# Patient Record
Sex: FEMALE | Race: BLACK OR AFRICAN AMERICAN | NOT HISPANIC OR LATINO | URBAN - METROPOLITAN AREA
[De-identification: names, ages, dates, MRNs, and addresses within clinical notes are randomized per-mention and may not be internally consistent; named-entity substitution may affect disease eponyms.]

---

## 2023-08-18 LAB
ALBUMIN SERPL-MCNC: 3.6 G/DL (ref 3.6–5.1)
ALBUMIN/GLOB SERPL: 0.9 {RATIO} (ref 1–2.4)
ALP SERPL-CCNC: 64 UNITS/L (ref 45–117)
ALT SERPL-CCNC: 14 UNITS/L
ANION GAP SERPL CALC-SCNC: 9 MMOL/L (ref 7–19)
APPEARANCE UR: CLEAR
AST SERPL-CCNC: 12 UNITS/L
BACTERIA #/AREA URNS HPF: ABNORMAL /HPF
BASOPHILS # BLD: 0 K/MCL (ref 0–0.3)
BASOPHILS NFR BLD: 0 %
BILIRUB SERPL-MCNC: 0.5 MG/DL (ref 0.2–1)
BILIRUB UR QL STRIP: NEGATIVE
BUN SERPL-MCNC: 13 MG/DL (ref 6–20)
BUN/CREAT SERPL: 17 (ref 7–25)
CALCIUM SERPL-MCNC: 8.7 MG/DL (ref 8.4–10.2)
CHLORIDE SERPL-SCNC: 112 MMOL/L (ref 97–110)
CO2 SERPL-SCNC: 22 MMOL/L (ref 21–32)
COLOR UR: ABNORMAL
CREAT SERPL-MCNC: 0.76 MG/DL (ref 0.51–0.95)
DEPRECATED RDW RBC: 46.1 FL (ref 39–50)
EGFRCR SERPLBLD CKD-EPI 2021: >90 ML/MIN/{1.73_M2}
EOSINOPHIL # BLD: 0.1 K/MCL (ref 0–0.5)
EOSINOPHIL NFR BLD: 1 %
ERYTHROCYTE [DISTWIDTH] IN BLOOD: 15.1 % (ref 11–15)
FASTING DURATION TIME PATIENT: ABNORMAL H
GLOBULIN SER-MCNC: 4 G/DL (ref 2–4)
GLUCOSE SERPL-MCNC: 98 MG/DL (ref 70–99)
GLUCOSE UR STRIP-MCNC: NEGATIVE MG/DL
HCT VFR BLD CALC: 38.5 % (ref 36–46.5)
HGB BLD-MCNC: 12.1 G/DL (ref 12–15.5)
HGB UR QL STRIP: NEGATIVE
HYALINE CASTS #/AREA URNS LPF: ABNORMAL /LPF
IMM GRANULOCYTES # BLD AUTO: 0 K/MCL (ref 0–0.2)
IMM GRANULOCYTES # BLD: 0 %
KETONES UR STRIP-MCNC: NEGATIVE MG/DL
LEUKOCYTE ESTERASE UR QL STRIP: ABNORMAL
LYMPHOCYTES # BLD: 2.4 K/MCL (ref 1–4.8)
LYMPHOCYTES NFR BLD: 35 %
MCH RBC QN AUTO: 26.1 PG (ref 26–34)
MCHC RBC AUTO-ENTMCNC: 31.4 G/DL (ref 32–36.5)
MCV RBC AUTO: 83.2 FL (ref 78–100)
MONOCYTES # BLD: 0.7 K/MCL (ref 0.3–0.9)
MONOCYTES NFR BLD: 10 %
MUCOUS THREADS URNS QL MICRO: PRESENT
NEUTROPHILS # BLD: 3.7 K/MCL (ref 1.8–7.7)
NEUTROPHILS NFR BLD: 54 %
NITRITE UR QL STRIP: NEGATIVE
NRBC BLD MANUAL-RTO: 0 /100 WBC
PH UR STRIP: 6.5 [PH] (ref 5–7)
PLATELET # BLD AUTO: 249 K/MCL (ref 140–450)
POTASSIUM SERPL-SCNC: 4.1 MMOL/L (ref 3.4–5.1)
PROT SERPL-MCNC: 7.6 G/DL (ref 6.4–8.2)
PROT UR STRIP-MCNC: ABNORMAL MG/DL
RBC # BLD: 4.63 MIL/MCL (ref 4–5.2)
RBC #/AREA URNS HPF: ABNORMAL /HPF
SODIUM SERPL-SCNC: 139 MMOL/L (ref 135–145)
SP GR UR STRIP: 1.03 (ref 1–1.03)
SQUAMOUS #/AREA URNS HPF: ABNORMAL /HPF
T VAGINALIS URNS QL MICRO: PRESENT
UROBILINOGEN UR STRIP-MCNC: 0.2 MG/DL
WBC # BLD: 6.9 K/MCL (ref 4.2–11)
WBC #/AREA URNS HPF: ABNORMAL /HPF

## 2023-08-18 PROCEDURE — 85025 COMPLETE CBC W/AUTO DIFF WBC: CPT | Performed by: EMERGENCY MEDICINE

## 2023-08-18 PROCEDURE — 80053 COMPREHEN METABOLIC PANEL: CPT | Performed by: EMERGENCY MEDICINE

## 2023-08-18 PROCEDURE — 87086 URINE CULTURE/COLONY COUNT: CPT | Performed by: EMERGENCY MEDICINE

## 2023-08-18 PROCEDURE — 81001 URINALYSIS AUTO W/SCOPE: CPT | Performed by: EMERGENCY MEDICINE

## 2023-08-18 PROCEDURE — 36415 COLL VENOUS BLD VENIPUNCTURE: CPT

## 2023-08-18 ASSESSMENT — PAIN SCALES - GENERAL: PAINLEVEL_OUTOF10: 10

## 2023-08-19 ENCOUNTER — HOSPITAL ENCOUNTER (EMERGENCY)
Age: 28
Discharge: HOME | End: 2023-08-19
Attending: EMERGENCY MEDICINE

## 2023-08-19 ENCOUNTER — APPOINTMENT (OUTPATIENT)
Dept: ULTRASOUND IMAGING | Age: 28
End: 2023-08-19
Attending: EMERGENCY MEDICINE

## 2023-08-19 VITALS
DIASTOLIC BLOOD PRESSURE: 86 MMHG | RESPIRATION RATE: 18 BRPM | HEART RATE: 74 BPM | BODY MASS INDEX: 26.52 KG/M2 | WEIGHT: 165 LBS | TEMPERATURE: 98.2 F | HEIGHT: 66 IN | OXYGEN SATURATION: 98 % | SYSTOLIC BLOOD PRESSURE: 140 MMHG

## 2023-08-19 DIAGNOSIS — N73.0 PID (ACUTE PELVIC INFLAMMATORY DISEASE): ICD-10-CM

## 2023-08-19 DIAGNOSIS — A59.9 TRICHOMONIASIS: Primary | ICD-10-CM

## 2023-08-19 LAB
CLUE CELLS VAG QL WET PREP: ABNORMAL
HCG UR QL: NEGATIVE
RAINBOW EXTRA TUBES HOLD SPECIMEN: NORMAL
T VAGINALIS VAG QL WET PREP: PRESENT
YEAST VAG QL WET PREP: ABNORMAL

## 2023-08-19 PROCEDURE — 87591 N.GONORRHOEAE DNA AMP PROB: CPT

## 2023-08-19 PROCEDURE — 10002803 HB RX 637

## 2023-08-19 PROCEDURE — 96372 THER/PROPH/DIAG INJ SC/IM: CPT

## 2023-08-19 PROCEDURE — 76830 TRANSVAGINAL US NON-OB: CPT

## 2023-08-19 PROCEDURE — 99284 EMERGENCY DEPT VISIT MOD MDM: CPT | Performed by: EMERGENCY MEDICINE

## 2023-08-19 PROCEDURE — 10002800 HB RX 250 W HCPCS

## 2023-08-19 PROCEDURE — 84703 CHORIONIC GONADOTROPIN ASSAY: CPT

## 2023-08-19 PROCEDURE — 10004651 HB RX, NO CHARGE ITEM: Performed by: EMERGENCY MEDICINE

## 2023-08-19 PROCEDURE — 87210 SMEAR WET MOUNT SALINE/INK: CPT

## 2023-08-19 PROCEDURE — 10002801 HB RX 250 W/O HCPCS

## 2023-08-19 RX ORDER — DOXYCYCLINE HYCLATE 100 MG/1
100 CAPSULE ORAL ONCE
Status: COMPLETED | OUTPATIENT
Start: 2023-08-19 | End: 2023-08-19

## 2023-08-19 RX ORDER — METRONIDAZOLE 500 MG/1
500 TABLET ORAL ONCE
Status: COMPLETED | OUTPATIENT
Start: 2023-08-19 | End: 2023-08-19

## 2023-08-19 RX ORDER — METRONIDAZOLE 500 MG/1
500 TABLET ORAL 2 TIMES DAILY
Qty: 28 TABLET | Refills: 0 | Status: SHIPPED | OUTPATIENT
Start: 2023-08-19 | End: 2023-09-02

## 2023-08-19 RX ORDER — ACETAMINOPHEN 500 MG
1000 TABLET ORAL ONCE
Status: COMPLETED | OUTPATIENT
Start: 2023-08-19 | End: 2023-08-19

## 2023-08-19 RX ORDER — DOXYCYCLINE HYCLATE 100 MG
100 TABLET ORAL 2 TIMES DAILY
Qty: 28 TABLET | Refills: 0 | Status: SHIPPED | OUTPATIENT
Start: 2023-08-19 | End: 2023-09-02

## 2023-08-19 RX ADMIN — ACETAMINOPHEN 1000 MG: 500 TABLET ORAL at 09:25

## 2023-08-19 RX ADMIN — METRONIDAZOLE 500 MG: 500 TABLET, FILM COATED ORAL at 10:33

## 2023-08-19 RX ADMIN — DOXYCYCLINE 100 MG: 100 CAPSULE ORAL at 10:33

## 2023-08-19 RX ADMIN — LIDOCAINE HYDROCHLORIDE 500 MG: 10 INJECTION, SOLUTION EPIDURAL; INFILTRATION; INTRACAUDAL; PERINEURAL at 10:34

## 2023-08-19 ASSESSMENT — PAIN SCALES - GENERAL: PAINLEVEL_OUTOF10: 10

## 2023-08-20 LAB — BACTERIA UR CULT: NORMAL

## 2023-08-23 LAB
C TRACH RRNA CVX QL NAA+PROBE: NEGATIVE
Lab: NORMAL
N GONORRHOEA RRNA CVX QL NAA+PROBE: NEGATIVE

## 2024-02-21 ENCOUNTER — HOSPITAL ENCOUNTER (OUTPATIENT)
Age: 29
Discharge: HOME | End: 2024-02-21
Payer: COMMERCIAL

## 2024-02-21 VITALS
OXYGEN SATURATION: 100 % | RESPIRATION RATE: 18 BRPM | TEMPERATURE: 97 F | HEART RATE: 67 BPM | DIASTOLIC BLOOD PRESSURE: 70 MMHG | SYSTOLIC BLOOD PRESSURE: 122 MMHG

## 2024-02-21 DIAGNOSIS — G43.809 OTHER MIGRAINE WITHOUT STATUS MIGRAINOSUS, NOT INTRACTABLE: Primary | ICD-10-CM

## 2024-02-21 PROCEDURE — 99213 OFFICE O/P EST LOW 20 MIN: CPT | Performed by: NURSE PRACTITIONER

## 2024-02-21 RX ORDER — RIMEGEPANT SULFATE 75 MG/75MG
75 TABLET, ORALLY DISINTEGRATING ORAL AS NEEDED
Qty: 10 TABLET | Refills: 0 | Status: SHIPPED | OUTPATIENT
Start: 2024-02-21

## 2024-02-21 NOTE — DISCHARGE INSTRUCTIONS
Try nurtec for your headache.  Rest in the dark.  Cool compresses to your forehead.  Increase water intake.  Establish care with neurology

## 2024-02-21 NOTE — ED PROVIDER NOTES
Patient Seen in: [facility]      History     Chief Complaint   Patient presents with    Headache     Stated Complaint: Headache    Subjective:   28-year-old female with ocular complex migraines presents from home.  She is accompanied by her significant other.  Patient presents with complaint of migraine for about 1 week.  States her headache comes and goes.  She is unsure what triggers the pain or resolves it.  States she has a frontal headache.  Some light sensitivity and blurry vision.  No fever.  No vomiting.  No recent head injury.  Extensive history of migraines.  States she normally takes Nurtec but ran out.  History of allergies to NSAIDs, Tylenol, Benadryl.  States her symptoms are consistent with previous migraines.  States her neurologist is in [place] and she has not seen them for many years.    The history is provided by the patient and a significant other. No  was used.       Objective:   Past Medical History:   Diagnosis Date    Migraines               History reviewed. No pertinent surgical history.             Social History     Socioeconomic History    Marital status: Single              Review of Systems    Positive for stated complaint: Headache  Other systems are as noted in HPI.  Constitutional and vital signs reviewed.      All other systems reviewed and negative except as noted above.    Physical Exam     ED Triage Vitals [02/21/24 1303]   /70   Pulse 67   Resp 18   Temp 97.2 °F (36.2 °C)   Temp src Temporal   SpO2 100 %   O2 Device None (Room air)       Current:/70   Pulse 67   Temp 97.2 °F (36.2 °C) (Temporal)   Resp 18   SpO2 100%         Physical Exam  Vitals and nursing note reviewed.   Constitutional:       General: She is not in acute distress.     Appearance: Normal appearance. She is not ill-appearing or toxic-appearing.   HENT:      Head: Normocephalic and atraumatic.      Comments: No facial rash or tenderness     Right Ear:  Tympanic membrane, ear canal and external ear normal.      Left Ear: Tympanic membrane, ear canal and external ear normal.      Nose: Nose normal.      Mouth/Throat:      Mouth: Mucous membranes are moist.      Pharynx: Oropharynx is clear.   Eyes:      Extraocular Movements: Extraocular movements intact.      Pupils: Pupils are equal, round, and reactive to light.      Comments: Pupils 2+ bilaterally.  No nystagmus.   Cardiovascular:      Rate and Rhythm: Normal rate and regular rhythm.      Pulses: Normal pulses.   Pulmonary:      Effort: Pulmonary effort is normal. No respiratory distress.      Breath sounds: Normal breath sounds.      Comments: Lungs clear.  No adventitious lung sounds.  No distress.  No hypoxia.  Pulse ox 100% ra. Which is normal    Abdominal:      General: Abdomen is flat.      Palpations: Abdomen is soft.   Musculoskeletal:         General: No signs of injury. Normal range of motion.      Cervical back: Normal range of motion and neck supple.   Skin:     General: Skin is warm and dry.      Capillary Refill: Capillary refill takes less than 2 seconds.   Neurological:      General: No focal deficit present.      Mental Status: She is alert and oriented to person, place, and time.      GCS: GCS eye subscore is 4. GCS verbal subscore is 5. GCS motor subscore is 6.      Cranial Nerves: Cranial nerves 2-12 are intact.      Sensory: Sensation is intact.      Motor: Motor function is intact.      Coordination: Coordination is intact.      Gait: Gait is intact.      Comments: Stroke score 0   Psychiatric:         Mood and Affect: Mood normal.         Behavior: Behavior normal.         Thought Content: Thought content normal.         Judgment: Judgment normal.         ED Course   Labs Reviewed - No data to display    MDM        Medical Decision Making  Typical migraine  Differential diagnosis: Migraine, other headache syndrome  Well-appearing on exam.  No distress.  No acute neurodeficit.  Headache not  sudden onset or thunderclap.  Not worst headache of life.  States symptoms consistent with previous known history of migraines.  No suspicion for subarachnoid hemorrhage.  No indication for emergent imaging.  No rash to indicate shingles  No meningeal signs  Offered COVID testing but patient declined  Discussed pain control here but patient has allergies to NSAIDs, Benadryl, Tylenol.  Patient specifically requesting a refill on her Nurtec which was provided (unable to see previous rx history on chart).  Recommend establishing care with neurology in [place], referral provided  Also requesting work note - provided  Results and plan of care discussed with the patient/family. They are in agreement with discharge. They understand to follow up with their primary doctor or the referral physician for further evaluation, especially if no improvement.  Also discussed the limitations of immediate care, patient is aware that if symptoms are worse they should go to the emergency room. Verbal and written discharge instructions were given.       Problems Addressed:  Other migraine without status migrainosus, not intractable: acute illness or injury    Amount and/or Complexity of Data Reviewed  Independent Historian:      Details: Significant other    Risk  Prescription drug management.        Disposition and Plan     Clinical Impression:  1. Other migraine without status migrainosus, not intractable         Disposition:  Discharge  2/21/2024  1:19 pm    Follow-up:  Noe Dockery DO  [address] [address]  [phone]                Medications Prescribed:  Current Discharge Medication List        START taking these medications    Details   Rimegepant Sulfate (NURTEC) 75 MG Oral Tablet Dispersible Take 75 mg by mouth as needed.  Qty: 10 tablet, Refills: 0

## 2024-04-13 ENCOUNTER — HOSPITAL ENCOUNTER (OUTPATIENT)
Age: 29
Discharge: HOME | End: 2024-04-13
Payer: COMMERCIAL

## 2024-04-13 ENCOUNTER — APPOINTMENT (OUTPATIENT)
Dept: ULTRASOUND IMAGING | Age: 29
End: 2024-04-13
Attending: NURSE PRACTITIONER
Payer: COMMERCIAL

## 2024-04-13 VITALS
DIASTOLIC BLOOD PRESSURE: 75 MMHG | HEART RATE: 68 BPM | RESPIRATION RATE: 20 BRPM | TEMPERATURE: 99 F | OXYGEN SATURATION: 98 % | SYSTOLIC BLOOD PRESSURE: 112 MMHG

## 2024-04-13 DIAGNOSIS — N93.9 ABNORMAL VAGINAL BLEEDING: Primary | ICD-10-CM

## 2024-04-13 LAB
#MXD IC: 0.4 X10ˆ3/UL (ref 0.1–1)
B-HCG UR QL: NEGATIVE
BUN BLD-MCNC: 14 MG/DL (ref 7–18)
CHLORIDE BLD-SCNC: 105 MMOL/L (ref 98–112)
CO2 BLD-SCNC: 28 MMOL/L (ref 21–32)
CREAT BLD-MCNC: 0.8 MG/DL
EGFRCR SERPLBLD CKD-EPI 2021: 102 ML/MIN/1.73M2 (ref 60–?)
GLUCOSE BLD-MCNC: 84 MG/DL (ref 70–99)
HCT VFR BLD AUTO: 41.1 %
HCT VFR BLD CALC: 46 %
HGB BLD-MCNC: 12.8 G/DL
ISTAT IONIZED CALCIUM FOR CHEM 8: 1.21 MMOL/L (ref 1.12–1.32)
LYMPHOCYTES # BLD AUTO: 2 X10ˆ3/UL (ref 1–4)
LYMPHOCYTES NFR BLD AUTO: 51.9 %
MCH RBC QN AUTO: 26.1 PG (ref 26–34)
MCHC RBC AUTO-ENTMCNC: 31.1 G/DL (ref 31–37)
MCV RBC AUTO: 83.7 FL (ref 80–100)
MIXED CELL %: 9.3 %
NEUTROPHILS # BLD AUTO: 1.4 X10ˆ3/UL (ref 1.5–7.7)
NEUTROPHILS NFR BLD AUTO: 38.8 %
PLATELET # BLD AUTO: 225 X10ˆ3/UL (ref 150–450)
POTASSIUM BLD-SCNC: 4 MMOL/L (ref 3.6–5.1)
RBC # BLD AUTO: 4.91 X10ˆ6/UL
SODIUM BLD-SCNC: 141 MMOL/L (ref 136–145)
WBC # BLD AUTO: 3.8 X10ˆ3/UL (ref 4–11)

## 2024-04-13 PROCEDURE — 93975 VASCULAR STUDY: CPT | Performed by: NURSE PRACTITIONER

## 2024-04-13 PROCEDURE — 80047 BASIC METABLC PNL IONIZED CA: CPT | Performed by: NURSE PRACTITIONER

## 2024-04-13 PROCEDURE — 99214 OFFICE O/P EST MOD 30 MIN: CPT | Performed by: NURSE PRACTITIONER

## 2024-04-13 PROCEDURE — 76830 TRANSVAGINAL US NON-OB: CPT | Performed by: NURSE PRACTITIONER

## 2024-04-13 PROCEDURE — 85025 COMPLETE CBC W/AUTO DIFF WBC: CPT | Performed by: NURSE PRACTITIONER

## 2024-04-13 PROCEDURE — 87591 N.GONORRHOEAE DNA AMP PROB: CPT | Performed by: NURSE PRACTITIONER

## 2024-04-13 PROCEDURE — 81025 URINE PREGNANCY TEST: CPT | Performed by: NURSE PRACTITIONER

## 2024-04-13 PROCEDURE — 76856 US EXAM PELVIC COMPLETE: CPT | Performed by: NURSE PRACTITIONER

## 2024-04-13 PROCEDURE — 87491 CHLMYD TRACH DNA AMP PROBE: CPT | Performed by: NURSE PRACTITIONER

## 2024-04-13 NOTE — ED PROVIDER NOTES
Chief Complaint   Patient presents with    Abdominal Pain       HPI:     Bill is a 28 year old female who presents with a chief complaint of right sided pelvic pain and vaginal bleeding with clots, ongoing since Monday. She reports bleeding though a pad an hour.  She reports she is currently trying to become pregnant, LMP March 8, 2024.  No fever, flank pain, dysuria, urinary frequency, urinary urgency.  She reports she has irregular periods.  She has never been on birth control.  She reports she has a history of right ovarian cysts.  No vomiting, diarrhea, or generalized abdominal pain.   Has a history of appendectomy    [facility]: [facility] asessment screens reviewed and agree.   Nurses notes reviewed I agree with documentation.    No family history on file.  Family history reviewed with patient/caregiver and is not pertinent to presenting problem.  Social History     Socioeconomic History    Marital status: Single     Spouse name: Not on file    Number of children: Not on file    Years of education: Not on file    Highest education level: Not on file   Occupational History    Not on file   Tobacco Use    Smoking status: Never    Smokeless tobacco: Never   Vaping Use    Vaping status: Never Used   Substance and Sexual Activity    Alcohol use: Never    Drug use: Never    Sexual activity: Not on file   Other Topics Concern    Not on file   Social History Narrative    Not on file     Social Determinants of Health     Financial Resource Strain: Not on file   Food Insecurity: Not on file   Transportation Needs: Not on file   Physical Activity: Not on file   Stress: Not on file   Social Connections: Not on file   Housing Stability: Not on file       ROS:   Positive for stated complaint: vaginal bleeding, pelvic pain  All other systems reviewed and negative except as noted above.  Constitutional and Vital Signs Reviewed.      Physical Exam:     /75   Pulse 68   Temp 98.5 °F (36.9 °C) (Temporal)   Resp 20   LMP  03/08/2024 (Exact Date)   SpO2 98%   GENERAL: well developed, well nourished, well hydrated, no distress  EYES: sclera non icteric bilateral  HEENT: atraumatic, normocephalic, ears, nose and throat are clear  NECK: supple, no adenopathy  LUNGS: clear to auscultation, no RRW  CARDIO: RRR without murmur  GI: right sided pelvic tenderness noted. No other abdominal tenderness. BS present and active in all quadrants.   : + red blood in vaginal canal. + right adnexal tenderness. NO left adnexal tenderness. NO CMT. Chaperone offered and declined.   EXTREMITIES: no cyanosis or edema. ROJAS without difficulty  SKIN: good skin turgor, no obvious rashes  MDM/Assessment/Plan:   Orders for this encounter:    Orders Placed This Encounter    US PELVIS EV W DOPPLER (CPT=76856/25632/47387)     eval g     Order Specific Question:   What is the Relevant Clinical Indication / Reason for Exam?     Answer:   eval g     Order Specific Question:   Release to patient     Answer:   Immediate    POCT CBC     Order Specific Question:   Release to patient     Answer:   Immediate    POCT Pregnancy, Urine    POCT ISTAT chem8 cartridge    Chlamydia/Gc Amplification     Order Specific Question:   Release to patient     Answer:   Immediate    POCT Pregnancy, Urine    iStat (Chem 8)       Labs performed this visit:  Recent Results (from the past 10 hour(s))   POCT Pregnancy, Urine    Collection Time: 04/13/24 11:45 AM   Result Value Ref Range    POCT Urine Pregnancy Negative Negative   POCT CBC    Collection Time: 04/13/24 12:25 PM   Result Value Ref Range    WBC IC 3.8 (L) 4.0 - 11.0 x10ˆ3/uL    RBC IC 4.91 3.80 - 5.30 X10ˆ6/uL    HGB IC 12.8 12.0 - 16.0 g/dL    HCT IC 41.1 35.0 - 48.0 %    MCV IC 83.7 80.0 - 100.0 fL    MCH IC 26.1 26.0 - 34.0 pg    MCHC IC 31.1 31.0 - 37.0 g/dL    PLT .0 150.0 - 450.0 X10ˆ3/uL    # Neutrophil 1.4 (L) 1.5 - 7.7 X10ˆ3/uL    # Lymphocyte 2.0 1.0 - 4.0 X10ˆ3/uL    # Mixed Cells 0.4 0.1 - 1.0 X10ˆ3/uL     Neutrophil % 38.8 %    Lymphocyte % 51.9 %    Mixed Cell % 9.3 %   POCT ISTAT chem8 cartridge    Collection Time: 04/13/24 12:30 PM   Result Value Ref Range    ISTAT Sodium 141 136 - 145 mmol/L    ISTAT BUN 14 7 - 18 mg/dL    ISTAT Potassium 4.0 3.6 - 5.1 mmol/L    ISTAT Chloride 105 98 - 112 mmol/L    ISTAT Ionized Calcium 1.21 1.12 - 1.32 mmol/L    ISTAT Hematocrit 46 34 - 50 %    ISTAT Glucose 84 70 - 99 mg/dL    ISTAT TCO2 28 21 - 32 mmol/L    ISTAT Creatinine 0.80 0.55 - 1.02 mg/dL    eGFR-Cr 102 >=60 mL/min/1.73m2        MDM:  Medical Decision Making  Differentials include: physiologic bleeding secondary to menstrual cycle versus dysfunctional uterine bleeding versus ovarian torsion versus ovarian abscess versus fibroid versus hemorrhagic cyst versus pregnancy related issue versus other    Urine pregnancy is negative.  Suspect physiologic bleeding secondary to menstrual cycle vs dysfunctional uterine bleeding. CBC shows normal hemoglobin.  No ovarian torsion or ovarian abscess noted on ultrasound.  No fibroid or hemorrhagic cyst noted on ultrasound.  Discussed follow-up with OB/GYN should abnormal bleeding continue.  ER for worsening symptoms.  Patient verbalized understanding and agreeable to plan of care.     Case discussed with Dr. Agatha Goldstein    Amount and/or Complexity of Data Reviewed  Labs: ordered. Decision-making details documented in ED Course.     Details: Urine pregnancy negative  CBC wnl- mildly decreased WBC and abs neut  Chem 8 normal  Radiology: ordered.     Details: Pelvic US-  no ovarian torsion or abscess     Risk  OTC drugs.            Diagnosis:    ICD-10-CM    1. Abnormal vaginal bleeding  N93.9 US PELVIS EV W DOPPLER (CPT=76856/38891/62737)     US PELVIS EV W DOPPLER (CPT=76856/45956/68861)     Chlamydia/Gc Amplification     Chlamydia/Gc Amplification          All results reviewed and discussed with patient.  See AVS for detailed discharge instructions for your condition  today.    Follow Up with:  Florentino Vaz, DO  [address] [facility] [address]  [facility] [id number] [phone]

## 2024-04-13 NOTE — DISCHARGE INSTRUCTIONS
Tylenol 650 mg every 4-6 hours as needed   Please follow up with OB/GYN as discussed  If you develop worsening symptoms, please go to ER  We will contact you with the result of your urine as they become available

## 2024-04-13 NOTE — ED INITIAL ASSESSMENT (HPI)
Pt with pain to \"R ovary\" and vaginal bleeding, including clots, since Monday. Pt described pain as burning, rated 9/10. Pt unsure if pregnant, LMP 03/08/24.

## 2024-04-15 LAB
C TRACH DNA SPEC QL NAA+PROBE: NEGATIVE
N GONORRHOEA DNA SPEC QL NAA+PROBE: NEGATIVE

## 2024-04-19 ENCOUNTER — OFFICE VISIT (OUTPATIENT)
Dept: OBGYN CLINIC | Facility: CLINIC | Age: 29
End: 2024-04-19
Payer: COMMERCIAL

## 2024-04-19 VITALS — WEIGHT: 180.19 LBS | DIASTOLIC BLOOD PRESSURE: 71 MMHG | SYSTOLIC BLOOD PRESSURE: 117 MMHG | HEART RATE: 82 BPM

## 2024-04-19 DIAGNOSIS — N83.209 RUPTURED OVARIAN CYST: Primary | ICD-10-CM

## 2024-04-19 PROCEDURE — 3078F DIAST BP <80 MM HG: CPT | Performed by: OBSTETRICS & GYNECOLOGY

## 2024-04-19 PROCEDURE — 99202 OFFICE O/P NEW SF 15 MIN: CPT | Performed by: OBSTETRICS & GYNECOLOGY

## 2024-04-19 PROCEDURE — 96127 BRIEF EMOTIONAL/BEHAV ASSMT: CPT | Performed by: OBSTETRICS & GYNECOLOGY

## 2024-04-19 PROCEDURE — 3074F SYST BP LT 130 MM HG: CPT | Performed by: OBSTETRICS & GYNECOLOGY

## 2024-04-20 NOTE — PROGRESS NOTES
Bill Dhillon is a 28 year old female  Patient's last menstrual period was 2024 (exact date).   Chief Complaint   Patient presents with    Gyn Problem     NEW PATIENT, F/[facility]     New pt.  Here with partner.   Here for immediate care f/u    Menses q month x 7 days.  LMP -.   Trying to get pregnant    Seen in [facility] on [facility] due to right pelvic pain.     Pelvic ultrasound normal.  Ovaries normal.  Small free fluid in cul de sac    Pelvic pain has resolved      OBSTETRICS HISTORY:  OB History    Para Term  AB Living   1 0 0 0 1 0   SAB IAB Ectopic Multiple Live Births   1 0 0 0 0       GYNE HISTORY   Pap Result Notes: UNSURE OF LAST PAP    MEDICAL HISTORY:  Past Medical History:    Migraines     Past Surgical History:   Procedure Laterality Date    Appendectomy         SOCIAL HISTORY:  Social History     Socioeconomic History    Marital status: Single   Tobacco Use    Smoking status: Never    Smokeless tobacco: Never   Vaping Use    Vaping status: Never Used   Substance and Sexual Activity    Alcohol use: Never    Drug use: Never       MEDICATIONS:  Current Outpatient Medications   Medication Sig Dispense Refill    Rimegepant Sulfate (NURTEC) 75 MG Oral Tablet Dispersible Take 75 mg by mouth as needed. 10 tablet 0       ALLERGIES:    Allergies   Allergen Reactions    Diphenhydramine SWELLING    Ibuprofen ANGIOEDEMA    Nsaids SWELLING    Penicillins SWELLING    Tramadol SWELLING         PHYSICAL EXAM:   /71   Pulse 82   Wt 180 lb 3.2 oz (81.7 kg)   LMP 2024 (Exact Date)   There is no height or weight on file to calculate BMI.    Constitutional: well developed, well nourished      Assessment & Plan:  1. Probable Ruptured ovarian cyst  Reassurance.  Discussed ocp to suppress ovarian cysts if not getting pregnant.  Questions answered.      Requested Prescriptions      No prescriptions requested or ordered in this encounter

## 2024-04-29 ENCOUNTER — HOSPITAL ENCOUNTER (OUTPATIENT)
Age: 29
Discharge: HOME | End: 2024-04-29
Payer: COMMERCIAL

## 2024-04-29 VITALS
OXYGEN SATURATION: 99 % | HEART RATE: 77 BPM | SYSTOLIC BLOOD PRESSURE: 132 MMHG | DIASTOLIC BLOOD PRESSURE: 82 MMHG | RESPIRATION RATE: 16 BRPM | TEMPERATURE: 99 F

## 2024-04-29 DIAGNOSIS — J06.9 UPPER RESPIRATORY VIRUS: Primary | ICD-10-CM

## 2024-04-29 LAB
POCT INFLUENZA A: NEGATIVE
POCT INFLUENZA B: NEGATIVE
S PYO AG THROAT QL: NEGATIVE
SARS-COV-2 RNA RESP QL NAA+PROBE: NOT DETECTED

## 2024-04-29 PROCEDURE — 87880 STREP A ASSAY W/OPTIC: CPT | Performed by: NURSE PRACTITIONER

## 2024-04-29 PROCEDURE — 99213 OFFICE O/P EST LOW 20 MIN: CPT | Performed by: NURSE PRACTITIONER

## 2024-04-29 PROCEDURE — U0002 COVID-19 LAB TEST NON-CDC: HCPCS | Performed by: NURSE PRACTITIONER

## 2024-04-29 PROCEDURE — 87502 INFLUENZA DNA AMP PROBE: CPT | Performed by: NURSE PRACTITIONER

## 2024-04-29 NOTE — ED PROVIDER NOTES
Patient Seen in: [facility]      History     Chief Complaint   Patient presents with    Body ache and/or chills    Sore Throat     Stated Complaint: Sore Throat  Subjective:   28-year-old healthy female presents for cough, congestion, sore throat, body aches, and chills that started a couple days ago.  No difficulty swallowing.  Speech is clear.  No difficulty breathing or stridor.  She is eating and drinking without vomiting or diarrhea.  No sick contacts at home, but there may have been someone sick at work.  No neck stiffness.  No rashes.  No headaches.  No dizziness.  She appears nontoxic.      Objective:   Past Medical History:    Migraines            Past Surgical History:   Procedure Laterality Date    Appendectomy                Social History     Socioeconomic History    Marital status: Single   Tobacco Use    Smoking status: Never    Smokeless tobacco: Never   Vaping Use    Vaping status: Never Used   Substance and Sexual Activity    Alcohol use: Never    Drug use: Never            Review of Systems    Positive for stated complaint: Sore Throat  Other systems are as noted in HPI.  Constitutional and vital signs reviewed.      All other systems reviewed and negative except as noted above.    Physical Exam     ED Triage Vitals [04/29/24 1401]   /82   Pulse 77   Resp 16   Temp 99.1 °F (37.3 °C)   Temp src Temporal   SpO2 99 %   O2 Device None (Room air)     Current:/82   Pulse 77   Temp 99.1 °F (37.3 °C) (Temporal)   Resp 16   LMP 04/08/2024 (Exact Date)   SpO2 99%     Physical Exam  Vitals and nursing note reviewed.   Constitutional:       General: She is not in acute distress.     Appearance: She is well-developed. She is not toxic-appearing.   HENT:      Head: Normocephalic.      Right Ear: A middle ear effusion is present. Tympanic membrane is not erythematous.      Left Ear: A middle ear effusion is present. Tympanic membrane is not erythematous.      Nose: Congestion  present. No rhinorrhea.      Mouth/Throat:      Mouth: Mucous membranes are moist. No oral lesions.      Pharynx: Oropharynx is clear. Uvula midline. Posterior oropharyngeal erythema present. No pharyngeal swelling, oropharyngeal exudate or uvula swelling.      Tonsils: No tonsillar exudate or tonsillar abscesses.   Eyes:      Conjunctiva/sclera: Conjunctivae normal.      Pupils: Pupils are equal, round, and reactive to light.   Cardiovascular:      Rate and Rhythm: Normal rate and regular rhythm.   Pulmonary:      Effort: Pulmonary effort is normal.      Breath sounds: Normal breath sounds. No wheezing, rhonchi or rales.   Musculoskeletal:      Cervical back: Normal range of motion and neck supple.   Lymphadenopathy:      Cervical: Cervical adenopathy present.   Skin:     General: Skin is warm and dry.      Capillary Refill: Capillary refill takes less than 2 seconds.      Findings: No rash.   Neurological:      General: No focal deficit present.      Mental Status: She is alert and oriented to person, place, and time.   Psychiatric:         Mood and Affect: Mood normal.         Behavior: Behavior normal.         ED Course   No results found.  Labs Reviewed   POCT RAPID STREP - Normal   POCT FLU TEST - Normal    Narrative:     This assay is a rapid molecular in vitro test utilizing nucleic acid amplification of influenza A and B viral RNA.   RAPID SARS-COV-2 BY PCR - Normal       MDM       Medical Decision Making  COVID, flu, and strep test are negative.  Her symptoms are most likely viral.  We discussed supportive care including pushing fluids, rest, and Tylenol as needed for pain or fever.  She will follow-up as needed with the primary care doctor referred to her if her symptoms persist.    Amount and/or Complexity of Data Reviewed  Independent Historian: spouse  Labs: ordered.     Details: COVID, flu, and strep test are negative.    Risk  OTC drugs.  Risk Details: COVID versus influenza versus strep throat  versus upper respiratory virus.        Disposition and Plan     Clinical Impression:  1. Upper respiratory virus         Disposition:  Discharge  4/29/2024  2:46 pm    Follow-up:  Leonidas Jarvis, BRETT  [address] [address]  [phone]    Schedule an appointment as soon as possible for a visit   As needed          Medications Prescribed:  Current Discharge Medication List

## 2024-04-29 NOTE — DISCHARGE INSTRUCTIONS
Push fluids. Rest. Tylenol or Motrin for pain or fever. Follow up as needed with your doctor. Return for any concerns.

## 2024-05-01 ENCOUNTER — HOSPITAL ENCOUNTER (OUTPATIENT)
Age: 29
Discharge: HOME | End: 2024-05-01
Payer: COMMERCIAL

## 2024-05-01 VITALS
OXYGEN SATURATION: 97 % | RESPIRATION RATE: 18 BRPM | DIASTOLIC BLOOD PRESSURE: 62 MMHG | HEART RATE: 69 BPM | SYSTOLIC BLOOD PRESSURE: 113 MMHG | TEMPERATURE: 98 F

## 2024-05-01 DIAGNOSIS — B97.89 VIRAL RESPIRATORY ILLNESS: Primary | ICD-10-CM

## 2024-05-01 DIAGNOSIS — J98.8 VIRAL RESPIRATORY ILLNESS: Primary | ICD-10-CM

## 2024-05-01 LAB
POCT INFLUENZA A: NEGATIVE
POCT INFLUENZA B: NEGATIVE
SARS-COV-2 RNA RESP QL NAA+PROBE: NOT DETECTED

## 2024-05-01 PROCEDURE — 87502 INFLUENZA DNA AMP PROBE: CPT | Performed by: NURSE PRACTITIONER

## 2024-05-01 PROCEDURE — 99213 OFFICE O/P EST LOW 20 MIN: CPT | Performed by: NURSE PRACTITIONER

## 2024-05-01 PROCEDURE — U0002 COVID-19 LAB TEST NON-CDC: HCPCS | Performed by: NURSE PRACTITIONER

## 2024-05-01 PROCEDURE — A9150 MISC/EXPER NON-PRESCRIPT DRU: HCPCS | Performed by: NURSE PRACTITIONER

## 2024-05-01 RX ORDER — ACETAMINOPHEN 500 MG
1000 TABLET ORAL ONCE
Status: COMPLETED | OUTPATIENT
Start: 2024-05-01 | End: 2024-05-01

## 2024-05-01 RX ORDER — BENZONATATE 200 MG/1
200 CAPSULE ORAL 3 TIMES DAILY PRN
Qty: 21 CAPSULE | Refills: 0 | Status: SHIPPED | OUTPATIENT
Start: 2024-05-01

## 2024-05-01 NOTE — ED INITIAL ASSESSMENT (HPI)
Pt c/o continued symptoms of cough, body aches, headache. Seen in [facility] 4/29. Neg flu/covid/strep testing. Denies fevers.

## 2024-05-01 NOTE — ED PROVIDER NOTES
Patient Seen in: [facility]      History     Chief Complaint   Patient presents with    Cough/URI     Stated Complaint: Cough, Body Aches, Headache    Subjective:   HPI    This is a 28-year-old female presenting with cough body aches headaches and chills.  Patient states that she was seen here Monday with negative flu COVID and strep but is still having her symptoms so wanted to be reevaluated.  Patient states she has been taking over-the-counter allergy medication but no other meds for her symptoms.  Denies any chest pain shortness of breath nausea vomiting or diarrhea.    Objective:   Past Medical History:    Migraines              Past Surgical History:   Procedure Laterality Date    Appendectomy                  Social History     Socioeconomic History    Marital status: Single   Tobacco Use    Smoking status: Never    Smokeless tobacco: Never   Vaping Use    Vaping status: Never Used   Substance and Sexual Activity    Alcohol use: Never    Drug use: Never              Review of Systems    Positive for stated complaint: Cough, Body Aches, Headache  Other systems are as noted in HPI.  Constitutional and vital signs reviewed.      All other systems reviewed and negative except as noted above.    Physical Exam     ED Triage Vitals [05/01/24 1128]   /62   Pulse 69   Resp 18   Temp 97.7 °F (36.5 °C)   Temp src Oral   SpO2 97 %   O2 Device None (Room air)       Current:/62   Pulse 69   Temp 97.7 °F (36.5 °C) (Oral)   Resp 18   LMP 04/08/2024 (Exact Date)   SpO2 97%         Physical Exam  Vitals and nursing note reviewed.   Constitutional:       Appearance: Normal appearance.   HENT:      Right Ear: Tympanic membrane normal.      Left Ear: Tympanic membrane normal.      Nose: Congestion present. No rhinorrhea.      Mouth/Throat:      Mouth: Mucous membranes are moist.      Pharynx: Oropharynx is clear. No oropharyngeal exudate or posterior oropharyngeal erythema.   Eyes:       Conjunctiva/sclera: Conjunctivae normal.   Cardiovascular:      Rate and Rhythm: Normal rate.   Pulmonary:      Effort: Pulmonary effort is normal. No respiratory distress.      Breath sounds: Normal breath sounds. No wheezing.      Comments: + cough  Musculoskeletal:         General: Normal range of motion.      Cervical back: Normal range of motion. No rigidity.   Neurological:      General: No focal deficit present.      Mental Status: She is alert and oriented to person, place, and time.               ED Course     Labs Reviewed   POCT FLU TEST - Normal    Narrative:     This assay is a rapid molecular in vitro test utilizing nucleic acid amplification of influenza A and B viral RNA.   RAPID SARS-COV-2 BY PCR - Normal                      MDM          Medical Decision Making  28-year-old female well-appearing nontoxic afebrile no hypoxia distress with viral symptoms.  DDx viral URI versus another respiratory viral illness versus viral illness versus influenza versus COVID.  Discussed with the patient is likely a viral illness she will be swabbed for COVID and flu  Tylenol be ordered for the headache.  Discussed that if the results are negative that is a virus and will need to run its course.  Anticipate prescribing benzonatate for the cough and discussing over-the-counter medications to help with symptoms.  Patient is agreeable with this plan of care.    Influenza and COVID-negative patient made aware of results discussed likely a respiratory viral illness.  Discussed prescribing benzonatate for the cough and over-the-counter medications to help with symptoms.  Discussed outpatient follow-up and ER precautions.  Patient acknowledged understanding discharge instructions.        Problems Addressed:  Viral respiratory illness: acute illness or injury    Amount and/or Complexity of Data Reviewed  Labs:  Decision-making details documented in ED Course.  Discussion of management or test interpretation with external  provider(s): This patient was discussed with my attending Dr. Harding who agrees with this provider's management and plan of care.    Risk  OTC drugs.  Prescription drug management.        Disposition and Plan     Clinical Impression:  1. Viral respiratory illness         Disposition:  Discharge  5/1/2024 11:53 am    Follow-up:  Leonidas Jarvis APRN  [address] [place] [address]  [phone]    In 1 week            Medications Prescribed:  Current Discharge Medication List        START taking these medications    Details   benzonatate 200 MG Oral Cap Take 1 capsule (200 mg total) by mouth 3 (three) times daily as needed for cough.  Qty: 21 capsule, Refills: 0

## 2024-05-01 NOTE — DISCHARGE INSTRUCTIONS
Follow-up with your primary care provider in a week.  Start the cough medication as prescribed continue Tylenol from over-the-counter for headache body aches or chills or fever drink plenty of fluids warm tea with honey you may continue over-the-counter allergy medication along with over-the-counter Flonase nasal spray in case you are getting any postnasal drip that could be contributing to the cough.  If you develop high fever that not alleviated with medication vomiting diarrhea chest pain shortness of breath or any new or worsening symptoms go to the nearest emergency department.

## 2024-06-04 ENCOUNTER — APPOINTMENT (OUTPATIENT)
Dept: GENERAL RADIOLOGY | Age: 29
End: 2024-06-04
Attending: PHYSICIAN ASSISTANT
Payer: COMMERCIAL

## 2024-06-04 ENCOUNTER — HOSPITAL ENCOUNTER (OUTPATIENT)
Age: 29
Discharge: HOME | End: 2024-06-04
Payer: COMMERCIAL

## 2024-06-04 VITALS
RESPIRATION RATE: 20 BRPM | TEMPERATURE: 98 F | HEART RATE: 85 BPM | OXYGEN SATURATION: 99 % | DIASTOLIC BLOOD PRESSURE: 68 MMHG | SYSTOLIC BLOOD PRESSURE: 110 MMHG

## 2024-06-04 DIAGNOSIS — J40 BRONCHITIS: Primary | ICD-10-CM

## 2024-06-04 LAB
B-HCG UR QL: NEGATIVE
POCT INFLUENZA A: NEGATIVE
POCT INFLUENZA B: NEGATIVE
S PYO AG THROAT QL: NEGATIVE

## 2024-06-04 PROCEDURE — 99213 OFFICE O/P EST LOW 20 MIN: CPT | Performed by: PHYSICIAN ASSISTANT

## 2024-06-04 PROCEDURE — 87880 STREP A ASSAY W/OPTIC: CPT | Performed by: PHYSICIAN ASSISTANT

## 2024-06-04 PROCEDURE — 87502 INFLUENZA DNA AMP PROBE: CPT | Performed by: PHYSICIAN ASSISTANT

## 2024-06-04 PROCEDURE — 71046 X-RAY EXAM CHEST 2 VIEWS: CPT | Performed by: PHYSICIAN ASSISTANT

## 2024-06-04 PROCEDURE — 81025 URINE PREGNANCY TEST: CPT | Performed by: PHYSICIAN ASSISTANT

## 2024-06-04 RX ORDER — FLUTICASONE PROPIONATE 50 MCG
2 SPRAY, SUSPENSION (ML) NASAL DAILY
Qty: 16 G | Refills: 0 | Status: SHIPPED | OUTPATIENT
Start: 2024-06-04 | End: 2024-06-14

## 2024-06-04 RX ORDER — BENZOCAINE AND MENTHOL, UNSPECIFIED FORM 15; 2.3 MG/1; MG/1
1 LOZENGE ORAL 3 TIMES DAILY PRN
Qty: 16 LOZENGE | Refills: 0 | Status: SHIPPED | OUTPATIENT
Start: 2024-06-04

## 2024-06-04 RX ORDER — CETIRIZINE HYDROCHLORIDE 10 MG/1
10 TABLET ORAL DAILY
Qty: 14 TABLET | Refills: 0 | Status: SHIPPED | OUTPATIENT
Start: 2024-06-04 | End: 2024-06-18

## 2024-06-04 RX ORDER — ALBUTEROL SULFATE 90 UG/1
2 AEROSOL, METERED RESPIRATORY (INHALATION) EVERY 4 HOURS PRN
Qty: 1 EACH | Refills: 0 | Status: SHIPPED | OUTPATIENT
Start: 2024-06-04 | End: 2024-06-18

## 2024-06-04 NOTE — ED PROVIDER NOTES
Chief Complaint   Patient presents with    Cough    Sore Throat       HPI:     Bill Dhillon is a 28 year old female who presents for evaluation of nasal congestion and chest congestion over the last 5 days.  Patient states taking Arce's without other OTC medications without improvement.  Denies sick contacts or exposures or documented fever.  Patient notes initial sore throat improving mildly over the last few days, 4 out of 10.  Denies dizziness earache dysphagia neck pain chest pain shortness of breath abdominal pain vomiting diarrhea dysuria or rash.  Notes 1 episode of posttussive vomiting yesterday.  Denies underlying lung issues including previous asthma bronchitis or pneumonia, non-smoker, denies oral birth control use or recent travel history.  Notes was seen for an upper respiratory infection last month with Tessalon Perles with mild improvement over the weeks and follow-up prior to symptoms developing this past week.      PFSH    PFS asessment screens reviewed and agree.  Nurses notes reviewed I agree with documentation.    History reviewed. No pertinent family history.  Family history reviewed with patient/caregiver and is not pertinent to presenting problem.  Social History     Socioeconomic History    Marital status: Single     Spouse name: Not on file    Number of children: Not on file    Years of education: Not on file    Highest education level: Not on file   Occupational History    Not on file   Tobacco Use    Smoking status: Never    Smokeless tobacco: Never   Vaping Use    Vaping status: Never Used   Substance and Sexual Activity    Alcohol use: Never    Drug use: Never    Sexual activity: Not on file   Other Topics Concern    Not on file   Social History Narrative    Not on file     Social Determinants of Health     Financial Resource Strain: Not on file   Food Insecurity: Not on file   Transportation Needs: Not on file   Physical Activity: Not on file   Stress: Not on file   Social  Connections: Not on file   Housing Stability: Not on file         ROS:   Positive for stated complaint: Cough congestion and sore throat.  All other systems reviewed and negative except as noted above.  Constitutional and Vital Signs Reviewed.      Physical Exam:     Findings:    /68   Pulse 85   Temp 97.6 °F (36.4 °C) (Temporal)   Resp 20   LMP 2024 (Approximate)   SpO2 99%   GENERAL: well developed, well nourished, well hydrated, no distress  SKIN: good skin turgor, no obvious rashes  NECK: No nuchal rigidity.  Supple, no adenopathy  EXTREMITIES: no cyanosis or edema. ROJAS without difficulty  GI: soft, non-tender, normal bowel sounds  HEAD: normocephalic, atraumatic  EYES: sclera non icteric bilateral, conjunctiva clear  EARS: TMs clear bilaterally. Canals clear.  NOSE: Positive rhinorrhea.  MMM.  Nasal turbinates: pink, normal mucosa  THROAT: mild erythema posterior pharynx, nonenlarged tonsils.  Without exudates, uvula midline, and airway patent  LUNGS: No retractions.  Clear to auscultation bilaterally; no rales, rhonchi, or wheezes  NEURO: No focal deficits  PSYCH: Alert and oriented x3.  Answering questions appropriately.  Mood appropriate.    MDM/Assessment/Plan:   Orders for this encounter:    Orders Placed This Encounter    XR CHEST PA + LAT CHEST (DIB=[id number])     Order Specific Question:   What is the Relevant Clinical Indication / Reason for Exam?     Answer:   COUGH, R/O INFILTRATE     Order Specific Question:   Release to patient     Answer:   Immediate    POCT Rapid Strep    POCT Pregnancy, Urine    POCT Flu Test     Order Specific Question:   Release to patient     Answer:   Immediate    POCT Pregnancy, Urine    POCT Rapid Strep    fluticasone propionate 50 MCG/ACT Nasal Suspension     Si sprays by Nasal route daily for 10 days.     Dispense:  16 g     Refill:  0    Benzocaine-Menthol (CEPACOL) 15-2.3 MG Mouth/Throat Lozenge     Sig: Use as directed 1 lozenge in the mouth or throat  3 (three) times daily as needed.     Dispense:  16 lozenge     Refill:  0    cetirizine 10 MG Oral Tab     Sig: Take 1 tablet (10 mg total) by mouth daily for 14 days.     Dispense:  14 tablet     Refill:  0    albuterol 108 (90 Base) MCG/ACT Inhalation Aero Soln     Sig: Inhale 2 puffs into the lungs every 4 (four) hours as needed (cough).     Dispense:  1 each     Refill:  0       Labs performed this visit:  Recent Results (from the past 10 hour(s))   POCT Flu Test    Collection Time: 06/04/24  2:52 PM    Specimen: Nares; Other   Result Value Ref Range    POCT INFLUENZA A Negative Negative    POCT INFLUENZA B Negative Negative   POCT Rapid Strep    Collection Time: 06/04/24  3:03 PM   Result Value Ref Range    POCT Rapid Strep Negative Negative   POCT Pregnancy, Urine    Collection Time: 06/04/24  3:10 PM   Result Value Ref Range    POCT Urine Pregnancy Negative Negative       MDM:  Patient chest x-ray without infiltrate, swabs negative, instructed recommendation for supportive measures for likely viral versus viral mental factor.  Agrees with bronchodilator for support of bronchospasm based on description and history with precaution use and indications.  Happy with plan of care.  Alert nontoxic.  PERC negative.    Diagnosis:    ICD-10-CM    1. Bronchitis  J40 XR CHEST PA + LAT CHEST (CPT=71046)     XR CHEST PA + LAT CHEST (CPT=71046)          All results reviewed and discussed with patient.  See AVS for detailed discharge instructions for your condition today.    Follow Up with:  Pete Escalona MD  [address] [place] [address]  [phone]    Schedule an appointment as soon as possible for a visit in 3 days  As needed, If symptoms worsen

## 2025-05-14 LAB
CYTOLOGY CVX/VAG DOC THIN PREP: NORMAL
HPV16+18+45 E6+E7MRNA CVX NAA+PROBE: NEGATIVE

## 2025-08-18 SDOH — ECONOMIC STABILITY: TRANSPORTATION INSECURITY
IN THE PAST 12 MONTHS, HAS LACK OF RELIABLE TRANSPORTATION KEPT YOU FROM MEDICAL APPOINTMENTS, MEETINGS, WORK OR FROM GETTING THINGS NEEDED FOR DAILY LIVING?: NO

## 2025-08-18 SDOH — ECONOMIC STABILITY: HOUSING INSECURITY: WHAT IS YOUR LIVING SITUATION TODAY?: I HAVE A STEADY PLACE TO LIVE

## 2025-08-18 SDOH — ECONOMIC STABILITY: FOOD INSECURITY: WITHIN THE PAST 12 MONTHS, THE FOOD YOU BOUGHT JUST DIDN'T LAST AND YOU DIDN'T HAVE MONEY TO GET MORE.: SOMETIMES TRUE

## 2025-08-18 SDOH — ECONOMIC STABILITY: HOUSING INSECURITY: DO YOU HAVE PROBLEMS WITH ANY OF THE FOLLOWING?: NONE OF THE ABOVE

## 2025-08-18 ASSESSMENT — SOCIAL DETERMINANTS OF HEALTH (SDOH): IN THE PAST 12 MONTHS, HAS THE ELECTRIC, GAS, OIL, OR WATER COMPANY THREATENED TO SHUT OFF SERVICE IN YOUR HOME?: NO

## 2025-08-20 ENCOUNTER — APPOINTMENT (OUTPATIENT)
Dept: OBGYN | Age: 30
End: 2025-08-20

## 2025-08-20 ENCOUNTER — APPOINTMENT (OUTPATIENT)
Dept: INTERNAL MEDICINE | Age: 30
End: 2025-08-20

## 2025-08-20 VITALS
BODY MASS INDEX: 30.63 KG/M2 | HEART RATE: 77 BPM | WEIGHT: 190.59 LBS | HEIGHT: 66 IN | OXYGEN SATURATION: 99 % | DIASTOLIC BLOOD PRESSURE: 62 MMHG | SYSTOLIC BLOOD PRESSURE: 110 MMHG | TEMPERATURE: 98.1 F | RESPIRATION RATE: 18 BRPM

## 2025-08-20 DIAGNOSIS — Z34.02 ENCOUNTER FOR SUPERVISION OF NORMAL FIRST PREGNANCY IN SECOND TRIMESTER (CMD): Primary | ICD-10-CM

## 2025-08-20 PROCEDURE — 99204 OFFICE O/P NEW MOD 45 MIN: CPT

## 2025-08-20 SDOH — HEALTH STABILITY: MENTAL HEALTH: HOW MANY STANDARD DRINKS CONTAINING ALCOHOL DO YOU HAVE ON A TYPICAL DAY?: 0,1 OR 2

## 2025-08-20 SDOH — HEALTH STABILITY: MENTAL HEALTH: AUDIT TOTAL SCORE: 0

## 2025-08-20 SDOH — HEALTH STABILITY: MENTAL HEALTH: HOW OFTEN DO YOU HAVE 6 OR MORE DRINKS ON ONE OCCASION?: NEVER

## 2025-08-20 SDOH — HEALTH STABILITY: MENTAL HEALTH: HOW OFTEN DO YOU HAVE A DRINK CONTAINING ALCOHOL?: NEVER

## 2025-08-20 ASSESSMENT — ENCOUNTER SYMPTOMS
FEVER: 0
SORE THROAT: 0
DIARRHEA: 0
SEIZURES: 0
CHILLS: 0
VOMITING: 0
COUGH: 0
FATIGUE: 0
SHORTNESS OF BREATH: 0
LIGHT-HEADEDNESS: 0
DIZZINESS: 0
ABDOMINAL PAIN: 0
CONSTIPATION: 0
NAUSEA: 0

## 2025-08-20 ASSESSMENT — PATIENT HEALTH QUESTIONNAIRE - PHQ9
SUM OF ALL RESPONSES TO PHQ9 QUESTIONS 1 AND 2: 0
2. FEELING DOWN, DEPRESSED OR HOPELESS: NOT AT ALL
1. LITTLE INTEREST OR PLEASURE IN DOING THINGS: NOT AT ALL
CLINICAL INTERPRETATION OF PHQ2 SCORE: NO FURTHER SCREENING NEEDED
SUM OF ALL RESPONSES TO PHQ9 QUESTIONS 1 AND 2: 0

## 2025-08-20 ASSESSMENT — PAIN SCALES - GENERAL: PAINLEVEL_OUTOF10: 0

## 2025-08-22 ENCOUNTER — CLINICAL ABSTRACT (OUTPATIENT)
Dept: INTERNAL MEDICINE | Age: 30
End: 2025-08-22

## 2025-08-26 ENCOUNTER — APPOINTMENT (OUTPATIENT)
Dept: OBGYN | Age: 30
End: 2025-08-26

## 2025-08-26 VITALS — BODY MASS INDEX: 30.53 KG/M2 | HEIGHT: 66 IN | WEIGHT: 190 LBS

## 2025-08-26 DIAGNOSIS — Z34.90 PREGNANCY, UNSPECIFIED GESTATIONAL AGE (CMD): Primary | ICD-10-CM

## 2025-08-26 RX ORDER — VITAMIN A ACETATE, BETA CAROTENE, ASCORBIC ACID, CHOLECALCIFEROL, .ALPHA.-TOCOPHEROL ACETATE, DL-, THIAMINE MONONITRATE, RIBOFLAVIN, NIACINAMIDE, PYRIDOXINE HYDROCHLORIDE, FOLIC ACID, CYANOCOBALAMIN, CALCIUM CARBONATE, FERROUS FUMARATE, ZINC OXIDE, CUPRIC OXIDE 3080; 12; 120; 400; 1; 1.84; 3; 20; 22; 920; 25; 200; 27; 10; 2 [IU]/1; UG/1; MG/1; [IU]/1; MG/1; MG/1; MG/1; MG/1; MG/1; [IU]/1; MG/1; MG/1; MG/1; MG/1; MG/1
1 TABLET, FILM COATED ORAL DAILY
COMMUNITY

## 2025-08-26 SDOH — SOCIAL STABILITY: SOCIAL INSECURITY: HOW OFTEN DOES ANYONE, INCLUDING FAMILY AND FRIENDS, SCREAM OR CURSE AT YOU?: NEVER

## 2025-08-26 SDOH — ECONOMIC STABILITY: GENERAL

## 2025-08-26 SDOH — SOCIAL STABILITY: SOCIAL NETWORK
HOW OFTEN DO YOU SEE OR TALK TO PEOPLE THAT YOU CARE ABOUT AND FEEL CLOSE TO? (FOR EXAMPLE: TALKING TO FRIENDS ON THE PHONE, VISITING FRIENDS OR FAMILY, GOING TO CHURCH OR CLUB MEETINGS): 3 TO 5 TIMES A WEEK

## 2025-08-26 SDOH — HEALTH STABILITY: PHYSICAL HEALTH: ON AVERAGE, HOW MANY MINUTES DO YOU ENGAGE IN EXERCISE AT THIS LEVEL?: 30 MIN

## 2025-08-26 SDOH — HEALTH STABILITY: PHYSICAL HEALTH: ON AVERAGE, HOW MANY DAYS PER WEEK DO YOU ENGAGE IN MODERATE TO STRENUOUS EXERCISE (LIKE A BRISK WALK)?: 7 DAYS

## 2025-08-26 SDOH — SOCIAL STABILITY: SOCIAL INSECURITY: HOW OFTEN DOES ANYONE, INCLUDING FAMILY AND FRIENDS, INSULT OR TALK DOWN TO YOU?: NEVER

## 2025-08-26 SDOH — SOCIAL STABILITY: SOCIAL INSECURITY: HOW OFTEN DOES ANYONE, INCLUDING FAMILY AND FRIENDS, THREATEN YOU WITH HARM?: NEVER

## 2025-08-26 SDOH — HEALTH STABILITY: MENTAL HEALTH
STRESS IS WHEN SOMEONE FEELS TENSE, NERVOUS, ANXIOUS, OR CAN'T SLEEP AT NIGHT BECAUSE THEIR MIND IS TROUBLED. HOW STRESSED ARE YOU?: NOT AT ALL

## 2025-08-26 SDOH — SOCIAL STABILITY: SOCIAL INSECURITY: HOW OFTEN DOES ANYONE, INCLUDING FAMILY AND FRIENDS, PHYSICALLY HURT YOU?: NEVER

## 2025-08-27 ENCOUNTER — FIRST OB VISIT (OUTPATIENT)
Dept: OBGYN | Age: 30
End: 2025-08-27

## 2025-08-27 VITALS
HEART RATE: 80 BPM | SYSTOLIC BLOOD PRESSURE: 102 MMHG | RESPIRATION RATE: 17 BRPM | BODY MASS INDEX: 30.54 KG/M2 | WEIGHT: 190.04 LBS | OXYGEN SATURATION: 100 % | HEIGHT: 66 IN | DIASTOLIC BLOOD PRESSURE: 68 MMHG

## 2025-08-27 DIAGNOSIS — F41.9 ANXIETY AND DEPRESSION: ICD-10-CM

## 2025-08-27 DIAGNOSIS — Z36.89 ENCOUNTER FOR FETAL ANATOMIC SURVEY (CMD): ICD-10-CM

## 2025-08-27 DIAGNOSIS — Z34.02 PRENATAL CARE, FIRST PREGNANCY IN SECOND TRIMESTER (CMD): Primary | ICD-10-CM

## 2025-08-27 DIAGNOSIS — O99.210 OBESITY IN PREGNANCY (CMD): ICD-10-CM

## 2025-08-27 DIAGNOSIS — F32.A ANXIETY AND DEPRESSION: ICD-10-CM

## 2025-08-27 LAB
APPEARANCE, POC: CLEAR
BILIRUB UR QL STRIP: NEGATIVE
COLOR UR: YELLOW
GLUCOSE UR-MCNC: NEGATIVE MG/DL
HGB UR QL STRIP: NEGATIVE
KETONES UR STRIP-MCNC: ABNORMAL MG/DL
LEUKOCYTE ESTERASE UR QL STRIP: ABNORMAL
NITRITE UR QL STRIP: NEGATIVE
PH UR: 6 [PH] (ref 5–7)
PROT UR-MCNC: NEGATIVE MG/DL
SP GR UR: 1.02 (ref 1–1.03)
TEST LOT EXPIRATION DATE: ABNORMAL
TEST LOT NUMBER: ABNORMAL
UROBILINOGEN UR-MCNC: 0.2 MG/DL (ref 0–1)

## 2025-08-27 PROCEDURE — 0500F INITIAL PRENATAL CARE VISIT: CPT | Performed by: STUDENT IN AN ORGANIZED HEALTH CARE EDUCATION/TRAINING PROGRAM

## 2025-08-27 PROCEDURE — 87086 URINE CULTURE/COLONY COUNT: CPT | Performed by: CLINICAL MEDICAL LABORATORY

## 2025-08-27 PROCEDURE — 87591 N.GONORRHOEAE DNA AMP PROB: CPT | Performed by: CLINICAL MEDICAL LABORATORY

## 2025-08-27 PROCEDURE — 87661 TRICHOMONAS VAGINALIS AMPLIF: CPT | Performed by: CLINICAL MEDICAL LABORATORY

## 2025-08-27 PROCEDURE — 87491 CHLMYD TRACH DNA AMP PROBE: CPT | Performed by: CLINICAL MEDICAL LABORATORY

## 2025-08-27 SDOH — HEALTH STABILITY: MENTAL HEALTH: HOW OFTEN DO YOU HAVE A DRINK CONTAINING ALCOHOL?: NEVER

## 2025-08-27 SDOH — HEALTH STABILITY: MENTAL HEALTH: AUDIT TOTAL SCORE: 0

## 2025-08-27 SDOH — HEALTH STABILITY: MENTAL HEALTH: HOW OFTEN DO YOU HAVE 6 OR MORE DRINKS ON ONE OCCASION?: NEVER

## 2025-08-27 SDOH — HEALTH STABILITY: MENTAL HEALTH: HOW MANY STANDARD DRINKS CONTAINING ALCOHOL DO YOU HAVE ON A TYPICAL DAY?: 0,1 OR 2

## 2025-08-27 ASSESSMENT — EDINBURGH POSTNATAL DEPRESSION SCALE (EPDS)
I HAVE LOOKED FORWARD WITH ENJOYMENT TO THINGS: AS MUCH AS I EVER DID
I HAVE BEEN SO UNHAPPY THAT I HAVE HAD DIFFICULTY SLEEPING: NOT AT ALL
TOTAL SCORE: 0
I HAVE FELT SCARED OR PANICKY FOR NO GOOD REASON: NO, NOT AT ALL
I HAVE BEEN ABLE TO LAUGH AND SEE THE FUNNY SIDE OF THINGS: AS MUCH AS I ALWAYS COULD
THINGS HAVE BEEN GETTING ON TOP OF ME: NO, I HAVE BEEN COPING AS WELL AS EVER
I HAVE BEEN ANXIOUS OR WORRIED FOR NO GOOD REASON: NO, NOT AT ALL
I HAVE BLAMED MYSELF UNNECESSARILY WHEN THINGS WENT WRONG: NO, NEVER
I HAVE FELT SAD OR MISERABLE: NO, NOT AT ALL
I HAVE BEEN SO UNHAPPY THAT I HAVE BEEN CRYING: NO, NEVER
THE THOUGHT OF HARMING MYSELF HAS OCCURRED TO ME: NEVER

## 2025-08-28 LAB
C TRACH RRNA UR QL NAA+PROBE: NEGATIVE
N GONORRHOEA RRNA UR QL NAA+PROBE: NEGATIVE
SERVICE CMNT-IMP: NORMAL
T VAGINALIS RRNA UR QL NAA+PROBE: NEGATIVE

## 2025-08-29 ENCOUNTER — LAB SERVICES (OUTPATIENT)
Dept: LAB | Age: 30
End: 2025-08-29

## 2025-08-29 ENCOUNTER — HOSPITAL ENCOUNTER (EMERGENCY)
Age: 30
Discharge: HOME | End: 2025-08-29

## 2025-08-29 DIAGNOSIS — Z34.90 PREGNANCY, UNSPECIFIED GESTATIONAL AGE (CMD): ICD-10-CM

## 2025-08-29 LAB
25(OH)D3+25(OH)D2 SERPL-MCNC: 22.1 NG/ML (ref 30–100)
ANNOTATION COMMENT IMP: NORMAL
BACTERIA UR CULT: NORMAL
DEPRECATED RDW RBC: 46.1 FL (ref 39–50)
ERYTHROCYTE [DISTWIDTH] IN BLOOD: 15.2 % (ref 11–15)
HAV IGM SER QL: NEGATIVE
HBA1C MFR BLD: 4.8 % (ref 4.5–5.6)
HBV CORE IGM SER QL: NEGATIVE
HBV SURFACE AG SER QL: NEGATIVE
HCT VFR BLD CALC: 37.4 % (ref 36–46.5)
HCV AB SER QL: NEGATIVE
HGB BLD-MCNC: 12 G/DL (ref 12–15.5)
HIV 1+2 AB+HIV1 P24 AG SERPL QL IA: NONREACTIVE
IMP & REVIEW OF LAB RESULTS: NORMAL
MCH RBC QN AUTO: 27.2 PG (ref 26–34)
MCHC RBC AUTO-ENTMCNC: 32.1 G/DL (ref 32–36.5)
MCV RBC AUTO: 84.8 FL (ref 78–100)
NRBC BLD MANUAL-RTO: 0 /100 WBC
PLATELET # BLD AUTO: 257 K/MCL (ref 140–450)
RBC # BLD: 4.41 MIL/MCL (ref 4–5.2)
RUBV IGG SERPL IA-ACNC: 36.2 UNITS/ML
TREPONEMA PALLIDUM IGG+IGM AB [PRESENCE] IN SERUM OR PLASMA BY IMMUNOASSAY: NONREACTIVE
VZV IGG SER QL IA: 2.19 S/CO
VZV IGG SER QL IA: NORMAL
WBC # BLD: 6.6 K/MCL (ref 4.2–11)

## 2025-08-29 ASSESSMENT — ENCOUNTER SYMPTOMS
COUGH: 0
SORE THROAT: 0
RHINORRHEA: 1
VOMITING: 0
CHILLS: 1
FEVER: 0
NAUSEA: 1
LIGHT-HEADEDNESS: 1
TROUBLE SWALLOWING: 0
DIARRHEA: 0
SHORTNESS OF BREATH: 1
BACK PAIN: 0
ABDOMINAL PAIN: 0
DIZZINESS: 0

## 2025-08-29 ASSESSMENT — PAIN SCALES - GENERAL: PAINLEVEL_OUTOF10: 0

## 2025-08-30 LAB
ABO + RH BLD: NORMAL
BLD GP AB SCN SERPL QL GEL: NEGATIVE

## 2025-09-01 ENCOUNTER — HOSPITAL ENCOUNTER (EMERGENCY)
Age: 30
Discharge: HOME | End: 2025-09-01

## 2025-09-01 VITALS
OXYGEN SATURATION: 100 % | WEIGHT: 192.24 LBS | SYSTOLIC BLOOD PRESSURE: 113 MMHG | BODY MASS INDEX: 30.9 KG/M2 | DIASTOLIC BLOOD PRESSURE: 73 MMHG | HEIGHT: 66 IN | TEMPERATURE: 99.1 F | RESPIRATION RATE: 16 BRPM | HEART RATE: 88 BPM

## 2025-09-01 DIAGNOSIS — M94.0 COSTOCHONDRITIS, ACUTE: ICD-10-CM

## 2025-09-01 DIAGNOSIS — R68.89 FLU-LIKE SYMPTOMS: Primary | ICD-10-CM

## 2025-09-01 LAB
ANION GAP SERPL CALC-SCNC: 13 MMOL/L (ref 7–19)
BASOPHILS # BLD: 0 K/MCL (ref 0–0.3)
BASOPHILS NFR BLD: 0 %
BUN SERPL-MCNC: 11 MG/DL (ref 6–20)
BUN/CREAT SERPL: 15 (ref 7–25)
CALCIUM SERPL-MCNC: 9 MG/DL (ref 8.4–10.2)
CHLORIDE SERPL-SCNC: 106 MMOL/L (ref 97–110)
CO2 SERPL-SCNC: 23 MMOL/L (ref 21–32)
CREAT SERPL-MCNC: 0.73 MG/DL (ref 0.51–0.95)
DEPRECATED RDW RBC: 44.5 FL (ref 39–50)
EGFRCR SERPLBLD CKD-EPI 2021: >90 ML/MIN/{1.73_M2}
EOSINOPHIL # BLD: 0.1 K/MCL (ref 0–0.5)
EOSINOPHIL NFR BLD: 1 %
ERYTHROCYTE [DISTWIDTH] IN BLOOD: 14.5 % (ref 11–15)
FASTING DURATION TIME PATIENT: NORMAL H
FLUAV RNA RESP QL NAA+PROBE: NOT DETECTED
FLUBV RNA RESP QL NAA+PROBE: NOT DETECTED
GLUCOSE SERPL-MCNC: 83 MG/DL (ref 70–99)
HCT VFR BLD CALC: 35.5 % (ref 36–46.5)
HGB BLD-MCNC: 11.5 G/DL (ref 12–15.5)
IMM GRANULOCYTES # BLD AUTO: 0 K/MCL (ref 0–0.2)
IMM GRANULOCYTES # BLD: 1 %
LYMPHOCYTES # BLD: 0.7 K/MCL (ref 1–4.8)
LYMPHOCYTES NFR BLD: 9 %
MCH RBC QN AUTO: 27.4 PG (ref 26–34)
MCHC RBC AUTO-ENTMCNC: 32.4 G/DL (ref 32–36.5)
MCV RBC AUTO: 84.5 FL (ref 78–100)
MONOCYTES # BLD: 0.6 K/MCL (ref 0.3–0.9)
MONOCYTES NFR BLD: 8 %
NEUTROPHILS # BLD: 6.5 K/MCL (ref 1.8–7.7)
NEUTROPHILS NFR BLD: 81 %
NRBC BLD MANUAL-RTO: 0 /100 WBC
PLATELET # BLD AUTO: 225 K/MCL (ref 140–450)
POTASSIUM SERPL-SCNC: 3.9 MMOL/L (ref 3.4–5.1)
RAINBOW EXTRA TUBES HOLD SPECIMEN: NORMAL
RBC # BLD: 4.2 MIL/MCL (ref 4–5.2)
RSV AG NPH QL IA.RAPID: NOT DETECTED
S PYO DNA THROAT QL NAA+PROBE: NOT DETECTED
SARS-COV-2 RNA RESP QL NAA+PROBE: NOT DETECTED
SERVICE CMNT-IMP: NORMAL
SERVICE CMNT-IMP: NORMAL
SODIUM SERPL-SCNC: 138 MMOL/L (ref 135–145)
TROPONIN I SERPL DL<=0.01 NG/ML-MCNC: 4 NG/L
WBC # BLD: 8 K/MCL (ref 4.2–11)

## 2025-09-01 PROCEDURE — 99284 EMERGENCY DEPT VISIT MOD MDM: CPT

## 2025-09-01 PROCEDURE — 84484 ASSAY OF TROPONIN QUANT: CPT

## 2025-09-01 PROCEDURE — 85025 COMPLETE CBC W/AUTO DIFF WBC: CPT

## 2025-09-01 PROCEDURE — 87651 STREP A DNA AMP PROBE: CPT

## 2025-09-01 PROCEDURE — A9150 MISC/EXPER NON-PRESCRIPT DRU: HCPCS

## 2025-09-01 PROCEDURE — 80048 BASIC METABOLIC PNL TOTAL CA: CPT

## 2025-09-01 PROCEDURE — 87637 SARSCOV2&INF A&B&RSV AMP PRB: CPT

## 2025-09-01 PROCEDURE — 10002803 HB RX 637

## 2025-09-01 PROCEDURE — 93005 ELECTROCARDIOGRAM TRACING: CPT

## 2025-09-01 PROCEDURE — 93010 ELECTROCARDIOGRAM REPORT: CPT | Performed by: INTERNAL MEDICINE

## 2025-09-01 RX ORDER — ACETAMINOPHEN 325 MG/1
650 TABLET ORAL ONCE
Status: COMPLETED | OUTPATIENT
Start: 2025-09-01 | End: 2025-09-01

## 2025-09-01 RX ADMIN — ACETAMINOPHEN 650 MG: 325 TABLET ORAL at 10:18

## 2025-09-01 ASSESSMENT — PAIN SCALES - GENERAL
PAINLEVEL_OUTOF10: 9
PAINLEVEL_OUTOF10: 8
PAINLEVEL_OUTOF10: 0

## 2025-09-01 ASSESSMENT — HEART SCORE
RISK FACTORS: 1-2 RISK FACTORS
EKG: NORMAL
AGE: LESS THAN OR EQUAL TO 45
HISTORY: SLIGHTLY SUSPICIOUS
HEART SCORE: 1

## 2025-09-01 ASSESSMENT — ENCOUNTER SYMPTOMS: COUGH: 1

## 2025-09-02 LAB
ATRIAL RATE (BPM): 86
P AXIS (DEGREES): 61
PR-INTERVAL (MSEC): 138
QRS-INTERVAL (MSEC): 66
QT-INTERVAL (MSEC): 356
QTC: 426
R AXIS (DEGREES): 69
REPORT TEXT: NORMAL
T AXIS (DEGREES): 16
VENTRICULAR RATE EKG/MIN (BPM): 86

## 2025-10-06 ENCOUNTER — APPOINTMENT (OUTPATIENT)
Dept: OBGYN | Age: 30
End: 2025-10-06

## 2025-10-07 ENCOUNTER — APPOINTMENT (OUTPATIENT)
Dept: MATERNAL FETAL MEDICINE | Age: 30
End: 2025-10-07

## (undated) NOTE — LETTER
Date & Time: 2/21/2024, 1:23 PM  Patient: Bill Dhillon  Encounter Provider(s):    Gisella Mcdonnell APRN       To Whom It May Concern:    Bill Dhillon was seen and treated in our department on 2/21/2024. She can return to work.    If you have any questions or concerns, please do not hesitate to call.      BRETT Simmons  _____________________________  Physician/APC Signature

## (undated) NOTE — LETTER
Date & Time: 6/4/2024, 3:34 PM  Patient: Bill Dhillon  Encounter Provider(s):    Ozzie Miller PA       To Whom It May Concern:    Bill Dhillon was seen and treated in our department on 6/4/2024. She should not return to work until Monday  .    If you have any questions or concerns, please do not hesitate to call.      Ozzie Miller   _____________________________  Physician/APC Signature

## (undated) NOTE — LETTER
Date & Time: 4/29/2024, 2:46 PM  Patient: Bill Dhillon  Encounter Provider(s):    Ramez Lyons APRN       To Whom It May Concern:    Bill Dhillon was seen and treated in our department on 4/29/2024.  Please excuse from work today and tomorrow.  If you have any questions or concerns, please do not hesitate to call.        _____________________________  Physician/APC Signature

## (undated) NOTE — LETTER
Date & Time: 5/1/2024, 11:56 AM  Patient: Bill Dhillon  Encounter Provider(s):    Flower Finn APRN       To Whom It May Concern:    Bill Dhillon was seen and treated in our department on 5/1/2024. She should not return to work until 05/06/2024 .    If you have any questions or concerns, please do not hesitate to call.    PANCHO Mcgee    _____________________________  Physician/APC Signature

## (undated) NOTE — LETTER
4/19/2024              Bill FRANCESCO Dhillon        [address]        [facility] [id number]         To whom it may concern,      Patricejanetturbyia Dhillon was seen at the office today.          Sincerely,        Eli Bonilla MD  [facility]  [address] [place] [id number] [phone]        Document electronically generated by:  Eli Bonilla MD